# Patient Record
(demographics unavailable — no encounter records)

---

## 2025-07-01 NOTE — HISTORY OF PRESENT ILLNESS
[FreeTextEntry1] : 46F w/ PMH of mild intermittent asthma and probable SVT presents for evaluation given FH of father and paternal uncle with early CAD.  She generally feels well.  She did have a hospitalization for bronchitis last year.  She was given a B1 agonist and ended up having tachycardia.  By the time she was evaluated her tachycardia had resolved.  Gets the palpitations very infrequently.  Denies angina chest pain, SOB, LE edema or syncope.  Last non-invasive imaging in our office was in 2007.    7/1/2025: She had some stressful time during February and had palpitations.  She was taking the metoprolol more frequently a that time.  Otherwise, she denies chest pains, SOB and LE edema.

## 2025-07-01 NOTE — CARDIOLOGY SUMMARY
[de-identified] : 6/7/2021: Sinus rhythm, possible short DE measuring at 114 ms, no clear delta wave seen. 12/6/2021: Sinus tachycardia, otherwise normal 6/6/2022: Sinus rhythm, normal 5/30/23: SR 5/21/2024: SR, nml 7/1/2025: NSR, nml [de-identified] : 3/4/2019: Ejection fraction 55%, minimal MR, normal left atrium, minimal TR, normal pulmonary artery systolic pressure.

## 2025-07-01 NOTE — DISCUSSION/SUMMARY
[Patient] : the patient [With Me] : with me [___ Year(s)] : in [unfilled] year(s) [FreeTextEntry1] : 46F w/ PMH as above presenting for follow up.  She is doing well from a cardiovascular perspective.  1. Palpitations - continue Lopressor 25 mg PO PRN palps.   2. Pulsatile abdominal aorta - no AAA seen on US.  Continued observation.  RTC 1 year [EKG obtained to assist in diagnosis and management of assessed problem(s)] : EKG obtained to assist in diagnosis and management of assessed problem(s)

## 2025-07-01 NOTE — REVIEW OF SYSTEMS
[Anxiety] : anxiety [Negative] : Heme/Lymph [FreeTextEntry7] : Eliseo MAXWELL [FreeTextEntry8] : DUB